# Patient Record
Sex: FEMALE | Race: WHITE
[De-identification: names, ages, dates, MRNs, and addresses within clinical notes are randomized per-mention and may not be internally consistent; named-entity substitution may affect disease eponyms.]

---

## 2021-06-21 ENCOUNTER — HOSPITAL ENCOUNTER (EMERGENCY)
Dept: HOSPITAL 53 - M ED | Age: 86
Discharge: HOME | End: 2021-06-21
Payer: OTHER GOVERNMENT

## 2021-06-21 VITALS — WEIGHT: 98.22 LBS | BODY MASS INDEX: 19.28 KG/M2 | HEIGHT: 60 IN

## 2021-06-21 VITALS — DIASTOLIC BLOOD PRESSURE: 71 MMHG | SYSTOLIC BLOOD PRESSURE: 182 MMHG

## 2021-06-21 DIAGNOSIS — S20.219A: ICD-10-CM
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
## 2021-06-21 DIAGNOSIS — S22.41XA: Primary | ICD-10-CM

## 2021-06-21 DIAGNOSIS — I10: ICD-10-CM

## 2021-06-21 LAB
BASOPHILS # BLD AUTO: 0 10^3/UL (ref 0–0.2)
BASOPHILS NFR BLD AUTO: 0.6 % (ref 0–1)
EOSINOPHIL # BLD AUTO: 0.3 10^3/UL (ref 0–0.5)
EOSINOPHIL NFR BLD AUTO: 3.4 % (ref 0–3)
HCT VFR BLD AUTO: 41.9 % (ref 36–47)
HGB BLD-MCNC: 13.6 G/DL (ref 12–15.5)
LYMPHOCYTES # BLD AUTO: 2.8 10^3/UL (ref 1.5–5)
LYMPHOCYTES NFR BLD AUTO: 37.9 % (ref 24–44)
MCH RBC QN AUTO: 31.7 PG (ref 27–33)
MCHC RBC AUTO-ENTMCNC: 32.5 G/DL (ref 32–36.5)
MCV RBC AUTO: 97.7 FL (ref 80–96)
MONOCYTES # BLD AUTO: 0.5 10^3/UL (ref 0–0.8)
MONOCYTES NFR BLD AUTO: 6.9 % (ref 2–8)
NEUTROPHILS # BLD AUTO: 3.7 10^3/UL (ref 1.5–8.5)
NEUTROPHILS NFR BLD AUTO: 50.9 % (ref 36–66)
PLATELET # BLD AUTO: 231 10^3/UL (ref 150–450)
RBC # BLD AUTO: 4.29 10^6/UL (ref 4–5.4)
WBC # BLD AUTO: 7.3 10^3/UL (ref 4–10)

## 2021-06-21 PROCEDURE — 71260 CT THORAX DX C+: CPT

## 2021-06-21 PROCEDURE — 99284 EMERGENCY DEPT VISIT MOD MDM: CPT

## 2021-06-21 PROCEDURE — 85025 COMPLETE CBC W/AUTO DIFF WBC: CPT

## 2021-06-21 PROCEDURE — 96374 THER/PROPH/DIAG INJ IV PUSH: CPT

## 2021-06-21 PROCEDURE — 80047 BASIC METABLC PNL IONIZED CA: CPT

## 2021-06-21 PROCEDURE — 70450 CT HEAD/BRAIN W/O DYE: CPT

## 2021-06-21 PROCEDURE — 36415 COLL VENOUS BLD VENIPUNCTURE: CPT

## 2021-06-21 PROCEDURE — 72125 CT NECK SPINE W/O DYE: CPT

## 2021-06-21 NOTE — REP
INDICATION:

fall injury; right sided chest pain.



COMPARISON:

None.



TECHNIQUE:

Helical scanning is acquired. 5 mm axial images were reformatted.  Coronal MPR images

were generated.



FINDINGS:

Bone window settings demonstrate an intact bony calvarium.  There is no evidence of

skull fracture or incidental bony calvarial lesion.  The visualized paranasal sinuses

appear clear.  No intraorbital abnormality is seen.  On soft tissue window setting

images; the lateral, third, and fourth ventricles are normal in size and position.

Gray-white differentiation pattern is normal above and below the tentorium.  There are

is no evidence of intracranial hemorrhage.  No mass, edema, infarction, or midline

shift is seen.  No extra-axial fluid collection is appreciated.



The patient is edentulous.  Some vascular calcification is seen at the skull base.

There is moderate generalized volume loss.  Mild small vessel changes are seen.



IMPRESSION:

Volume loss and small vessel changes.  Some vascular calcification.  No acute

intracranial abnormality..





<Electronically signed by Edgardo Kirby > 06/21/21 [id number]

## 2021-06-21 NOTE — REP
INDICATION:

fall injury; ?head injury/confused.



COMPARISON:

None.



TECHNIQUE:

CT chest performed following the intravenous administration of 100 cc of Isovue 370.

Sagittal and coronal reconstruction images are performed.



FINDINGS:

Lungs: There are mild bibasilar fibro atelectatic changes.

Mediastinum: No adenopathy.

Lacy: No adenopathy.

Axilla: No adenopathy.

Pleura: No effusion.

Heart: Not enlarged.

Thoracic aorta: No aneurysm or dissection.

Upper abdominal structures: There is a small hiatal hernia.  There has been a prior

cholecystectomy.  There is nonspecific 7 mm nodule in the lateral segment of the left

lobe of the liver.

Visualized osseous structures: There is an old left 1st rib fracture.  There is

nondisplaced fracture of the anterior right 4th rib and 5th rib, which may be old or

acute.  There is mild anterior loss of height of the T 7 vertebral body, moderate loss

of height of T9, and a mild loss of height of T10, all likely chronic in nature.

There is evidence of prior kyphoplasty of L1 compression deformity.



IMPRESSION:

Mild bibasilar fibro atelectatic changes.  Thoracic aorta intact with no aneurysm or

dissection.



Nondisplaced fractures anterior right 4th and 5th ribs could be old or acute.



Compression deformities of T7, T9 and T10 are probably old.  Recommend clinical

correlation.





<Electronically signed by Howard Estrella > 06/21/21 [id number]

## 2021-06-21 NOTE — REP
INDICATION:

fall injury; right sided chest pain.



COMPARISON:

None.



TECHNIQUE:

2 mm increments using standard helical technique and reconstructed in both sagittal

and coronal planes.



FINDINGS:

Vertebral body height and alignment is within normal limits.  The facet joints are

well aligned bilaterally.  There is disc space narrowing at every level particularly

C4-5 were anterior and posterior osteophytic ridging is the heaviest.  There is no

acute fracture or subluxation.  Degenerative facet and uncovertebral joint changes are

present at every level bilaterally.  There is no abnormal paraspinal soft tissue

swelling.  The bones are demineralized.



IMPRESSION:

Chronic changes as described above.





<Electronically signed by Jose Hernandez > 06/21/21 0213